# Patient Record
Sex: FEMALE | Race: WHITE | URBAN - METROPOLITAN AREA
[De-identification: names, ages, dates, MRNs, and addresses within clinical notes are randomized per-mention and may not be internally consistent; named-entity substitution may affect disease eponyms.]

---

## 2020-03-05 ENCOUNTER — OFFICE VISIT (OUTPATIENT)
Dept: URBAN - METROPOLITAN AREA CLINIC 16 | Facility: CLINIC | Age: 75
End: 2020-03-05
Payer: MEDICARE

## 2020-03-05 DIAGNOSIS — H40.013 OPEN ANGLE WITH BORDERLINE FINDINGS, LOW RISK, BILATERAL: ICD-10-CM

## 2020-03-05 DIAGNOSIS — H18.51 FUCHS' DYSTROPHY: ICD-10-CM

## 2020-03-05 DIAGNOSIS — E11.9 TYPE 2 DIABETES MELLITUS W/O COMPLICATION: Primary | ICD-10-CM

## 2020-03-05 DIAGNOSIS — H25.13 AGE-RELATED NUCLEAR CATARACT, BILATERAL: ICD-10-CM

## 2020-03-05 PROCEDURE — 92004 COMPRE OPH EXAM NEW PT 1/>: CPT | Performed by: OPTOMETRIST

## 2020-03-05 RX ORDER — SODIUM CHLORIDE 50 MG/ML
5 % SOLUTION OPHTHALMIC
Qty: 1 | Refills: 6 | Status: ACTIVE
Start: 2020-03-05

## 2020-03-05 ASSESSMENT — KERATOMETRY
OS: 45.38
OD: 45.50

## 2020-03-05 ASSESSMENT — INTRAOCULAR PRESSURE
OS: 16
OD: 16

## 2020-03-05 NOTE — IMPRESSION/PLAN
Impression: Type 2 diabetes mellitus w/o complication: J68.6. OU. Plan: No Non-Proliferative Diabetic Retinopathy, no Diabetic Macular Edema and no Neovascularization of the iris, disc, or elsewhere. Discussed ocular and systemic benefits of blood sugar control. Send notes to PCP. Check annually. RTC 1 year x CEE.

## 2020-03-05 NOTE — IMPRESSION/PLAN
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 Plan: Pt ed re: condition. Begin David 128 BID OU. RTC 3-4 months x follow up.

## 2020-07-06 ENCOUNTER — TESTING ONLY (OUTPATIENT)
Dept: URBAN - METROPOLITAN AREA CLINIC 16 | Facility: CLINIC | Age: 75
End: 2020-07-06
Payer: MEDICARE

## 2020-07-06 PROCEDURE — 76514 ECHO EXAM OF EYE THICKNESS: CPT | Performed by: OPTOMETRIST

## 2020-07-06 PROCEDURE — 92133 CPTRZD OPH DX IMG PST SGM ON: CPT | Performed by: OPTOMETRIST

## 2020-07-06 PROCEDURE — 92083 EXTENDED VISUAL FIELD XM: CPT | Performed by: OPTOMETRIST

## 2022-06-28 ENCOUNTER — OFFICE VISIT (OUTPATIENT)
Dept: URBAN - METROPOLITAN AREA CLINIC 13 | Facility: CLINIC | Age: 77
End: 2022-06-28
Payer: MEDICARE

## 2022-06-28 DIAGNOSIS — H25.11 AGE-RELATED NUCLEAR CATARACT, RIGHT EYE: ICD-10-CM

## 2022-06-28 DIAGNOSIS — H35.372 PUCKERING OF MACULA, LEFT EYE: ICD-10-CM

## 2022-06-28 DIAGNOSIS — H43.812 VITREOUS DEGENERATION, LEFT EYE: ICD-10-CM

## 2022-06-28 DIAGNOSIS — H17.9 CORNEAL SCAR: Primary | ICD-10-CM

## 2022-06-28 DIAGNOSIS — Z96.1 PRESENCE OF INTRAOCULAR LENS: ICD-10-CM

## 2022-06-28 PROCEDURE — 99204 OFFICE O/P NEW MOD 45 MIN: CPT | Performed by: OPHTHALMOLOGY

## 2022-06-28 PROCEDURE — 92134 CPTRZ OPH DX IMG PST SGM RTA: CPT | Performed by: OPHTHALMOLOGY

## 2022-06-28 ASSESSMENT — INTRAOCULAR PRESSURE
OD: 12
OS: 12

## 2022-06-28 NOTE — IMPRESSION/PLAN
Impression: Corneal scar: H17.9. Plan: s/p transplant with Dr. Farzad Emanuel Looks stable No evidence of heme on exam
Small heme could be transient and as a result of borderline DM Would rec obs Call ASAP with changes

prn RCA

## 2022-06-28 NOTE — IMPRESSION/PLAN
Impression: Puckering of macula, left eye: H35.372. OCT OU - no IRF/SRF OU, ERM OS  / 308  Plan: There is an epiretinal membrane (ERM), but the patient is doing well with their current vision, and thus we will observe the ERM for now. We did discuss the natural history as well as the risks and benefits of observation vs. vitrectomy surgery. The patient will call if they experience decreased vision or increased metamorphopsia.